# Patient Record
Sex: FEMALE | URBAN - METROPOLITAN AREA
[De-identification: names, ages, dates, MRNs, and addresses within clinical notes are randomized per-mention and may not be internally consistent; named-entity substitution may affect disease eponyms.]

---

## 2021-10-05 ENCOUNTER — OFFICE VISIT (OUTPATIENT)
Dept: URBAN - METROPOLITAN AREA CLINIC 10 | Facility: CLINIC | Age: 66
End: 2021-10-05

## 2021-10-05 DIAGNOSIS — H44.23 DEGENERATIVE MYOPIA, BILATERAL: ICD-10-CM

## 2021-10-05 DIAGNOSIS — H52.4 PRESBYOPIA: ICD-10-CM

## 2021-10-05 DIAGNOSIS — H40.1134 PRIMARY OPEN-ANGLE GLAUCOMA, INDETERMINATE, BILATERAL: Primary | ICD-10-CM

## 2021-10-05 PROCEDURE — 99204 OFFICE O/P NEW MOD 45 MIN: CPT | Performed by: OPTOMETRIST

## 2021-10-05 PROCEDURE — 92133 CPTRZD OPH DX IMG PST SGM ON: CPT | Performed by: OPTOMETRIST

## 2021-10-05 PROCEDURE — 92134 CPTRZ OPH DX IMG PST SGM RTA: CPT | Performed by: OPTOMETRIST

## 2021-10-05 RX ORDER — LATANOPROST 50 UG/ML
0.005 % SOLUTION OPHTHALMIC
Qty: 1 | Refills: 3 | Status: INACTIVE
Start: 2021-10-05 | End: 2022-02-24

## 2021-10-05 ASSESSMENT — VISUAL ACUITY
OS: 20/125
OD: 20/50

## 2021-10-05 ASSESSMENT — INTRAOCULAR PRESSURE
OD: 31
OS: 24

## 2021-10-05 NOTE — IMPRESSION/PLAN
Impression: Primary open-angle glaucoma, indeterminate, bilateral: H45.6373. Plan: Severe cupping c PPA and myopic degeneration OU. Diagnosed 7 years ago. Pt states out of gts 8 months. IOP is elevated OD>OS. OCT RNFL thinning OU but poor scan OU. Pt was on previous unknown bedtime gt. Start Latanoprost QHS OU. Establish care c glaucoma team 1-2 mo.

## 2021-10-05 NOTE — IMPRESSION/PLAN
Impression: Degenerative myopia, bilateral: H44.23. Plan: Limits acuity. Symptoms of RD discussed, RTC immediately if they occur.